# Patient Record
Sex: MALE | Race: WHITE
[De-identification: names, ages, dates, MRNs, and addresses within clinical notes are randomized per-mention and may not be internally consistent; named-entity substitution may affect disease eponyms.]

---

## 2018-03-20 ENCOUNTER — HOSPITAL ENCOUNTER (OUTPATIENT)
Dept: HOSPITAL 88 - CT | Age: 48
End: 2018-03-20
Attending: FAMILY MEDICINE
Payer: COMMERCIAL

## 2018-03-20 DIAGNOSIS — R10.84: Primary | ICD-10-CM

## 2018-03-20 LAB
BUN SERPL-MCNC: 22 MG/DL (ref 7–26)
BUN/CREAT SERPL: 23 (ref 6–25)
EGFRCR SERPLBLD CKD-EPI 2021: > 60 ML/MIN (ref 60–?)

## 2018-03-20 PROCEDURE — 36415 COLL VENOUS BLD VENIPUNCTURE: CPT

## 2018-03-20 PROCEDURE — 84520 ASSAY OF UREA NITROGEN: CPT

## 2018-03-20 PROCEDURE — 74177 CT ABD & PELVIS W/CONTRAST: CPT

## 2018-03-20 PROCEDURE — 82565 ASSAY OF CREATININE: CPT

## 2018-03-20 NOTE — DIAGNOSTIC IMAGING REPORT
PROCEDURE: CT ABDOMEN AND PELVIS WITH CONTRAST

 

TECHNIQUE: 

The abdomen and pelvis were scanned utilizing a multidetector helical 

scanner from the diaphragm to the lesser trochanter after the IV 

administration of 100 cc of Isovue 370 and the oral administration of 

water.  Coronal and sagittal multiplanar reformations were obtained.

 

COMPARISON: None.

 

INDICATIONS:   UPPER ABD PAIN

 

FINDINGS:

LOWER THORAX: Unremarkable.

 

HEPATOBILIARY: 1.1 x 1.1 cm low density simple cyst in hepatic segment 

VII at the dome (series 2 image 13). No other focal lesions. No biliary 

ductal dilation. Gallbladder is unremarkable.

SPLEEN: No splenomegaly.

PANCREAS: Normal pancreatic parenchymal enhancement. No peripancreatic 

inflammatory changes, free fluid or fluid collections. No focal lesions 

or ductal dilation.

 

ADRENALS: No adrenal nodules.

KIDNEYS/URETERS: No hydronephrosis, stones, or solid mass lesions.

PELVIC ORGANS/BLADDER: Bladder and prostate are unremarkable.

 

PERITONEUM / RETROPERITONEUM: Trace to small perihepatic and 

perisplenic ascites (series 2, image 19). Small amount of free fluid in 

the pelvis (for example series 2, image 87).

 

LYMPH NODES: No lymphadenopathy.

VESSELS: Unremarkable.

 

GI TRACT: Multiple loops of jejunum and proximal ileum show mild to 

moderate dilation, with maximal diameter of 4.4 cm (for example, 

coronal image 44 and series 2, image 41).

A transition point to decompressed bowel is noted in the mid ileum 

(coronal image 63 and sagittal image 84). No focal mass or wall 

thickening is noted At this location. The rest of the mid and distal 

small bowel as well the large bowel are decompressed. Questionable wall 

of the mid-distal ileum. Moderate associated mesenteric stranding and 

small amount of fluid in the mesenteric leaves. Moderate stranding with 

mild nodularity of the omentum (for example series 2, image 52). 

Stomach is unremarkable.

 

BONES AND SOFT TISSUES: No aggressive lytic lesion. Soft tissues are 

grossly unremarkable.

 

IMPRESSION:

 

1. Multiple loops of mild to moderately dilated jejunum and proximal 

ileum, with transition point in the mid ileum to normal-caliber bowel, 

with associated mesenteric stranding and small amount of mesenteric 

free fluid and ascites. Findings suggestive of enteritis, which may be 

infectious or inflammatory (including inflammatory bowel disease such 

as Crohn's). No prior surgical history to suggest adhesions. The large 

bowel is grossly unremarkable, without dilation, wall thickening or 

associated stranding.

2. Moderate stranding and mild nodularity of the omentum. This may be 

secondary to omental edema and inflammation from adjacent bowel loops. 

Neoplastic peritoneal disease may have similar appearance, and could be 

considered if there is prior history of primary neoplasm.

3. No CT evidence of pancreatitis.

4. Findings discussed with Dr. HUMBLE Ball March 20, 2018 at 1240 hrs.

 

 

Jasper Gonzalez M.D.  

Dictated by:  Jasper Gonzalez M.D. on 3/20/2018 at 13:07     

Electronically approved by:  Jasper Gonzalez M.D. on 3/20/2018 at 

13:07

## 2018-03-22 LAB
ALBUMIN SERPL-MCNC: 3.9 G/DL (ref 3.5–5)
ALBUMIN/GLOB SERPL: 1 {RATIO} (ref 0.8–2)
ALP SERPL-CCNC: 51 IU/L (ref 40–150)
ALT SERPL-CCNC: 33 IU/L (ref 0–55)
ANION GAP SERPL CALC-SCNC: 18.8 MMOL/L (ref 8–16)
BASOPHILS # BLD AUTO: 0 10*3/UL (ref 0–0.1)
BASOPHILS NFR BLD AUTO: 0.4 % (ref 0–1)
BUN SERPL-MCNC: 16 MG/DL (ref 7–26)
BUN/CREAT SERPL: 18 (ref 6–25)
CALCIUM SERPL-MCNC: 9.5 MG/DL (ref 8.4–10.2)
CHLORIDE SERPL-SCNC: 92 MMOL/L (ref 98–107)
CO2 SERPL-SCNC: 24 MMOL/L (ref 22–29)
DEPRECATED NEUTROPHILS # BLD AUTO: 7.4 10*3/UL (ref 2.1–6.9)
EGFRCR SERPLBLD CKD-EPI 2021: > 60 ML/MIN (ref 60–?)
EOSINOPHIL # BLD AUTO: 0.1 10*3/UL (ref 0–0.4)
EOSINOPHIL NFR BLD AUTO: 1 % (ref 0–6)
ERYTHROCYTE [DISTWIDTH] IN CORD BLOOD: 12.7 % (ref 11.7–14.4)
GLOBULIN PLAS-MCNC: 4.1 G/DL (ref 2.3–3.5)
GLUCOSE SERPLBLD-MCNC: 82 MG/DL (ref 74–118)
HCT VFR BLD AUTO: 43.8 % (ref 38.2–49.6)
HGB BLD-MCNC: 15 G/DL (ref 14–18)
LYMPHOCYTES # BLD: 1.5 10*3/UL (ref 1–3.2)
LYMPHOCYTES NFR BLD AUTO: 15.6 % (ref 18–39.1)
MCH RBC QN AUTO: 29.4 PG (ref 28–32)
MCHC RBC AUTO-ENTMCNC: 34.2 G/DL (ref 31–35)
MCV RBC AUTO: 85.7 FL (ref 81–99)
MONOCYTES # BLD AUTO: 0.8 10*3/UL (ref 0.2–0.8)
MONOCYTES NFR BLD AUTO: 7.7 % (ref 4.4–11.3)
NEUTS SEG NFR BLD AUTO: 75 % (ref 38.7–80)
PLATELET # BLD AUTO: 366 X10E3/UL (ref 140–360)
POTASSIUM SERPL-SCNC: 3.8 MMOL/L (ref 3.5–5.1)
RBC # BLD AUTO: 5.11 X10E6/UL (ref 4.3–5.7)
SODIUM SERPL-SCNC: 131 MMOL/L (ref 136–145)

## 2018-03-22 NOTE — DIAGNOSTIC IMAGING REPORT
PROCEDURE:

Frontal and lateral views of the chest.

 

COMPARISON: None.

 

INDICATIONS:   PRE OPERATIVE CHEST X-RAY FOR COLONOSCOPY, EGD

     

FINDINGS:

Lines/tubes:  None.

 

Lungs:  The lungs are well inflated and clear. There is no evidence of 

pneumonia or pulmonary edema.

 

Pleura:  There is no pleural effusion or pneumothorax.

 

Heart and mediastinum:  The heart and the mediastinum are normal.

 

Bones:  No acute bony abnormality.

 

IMPRESSION: 

 

1.  No acute cardiopulmonary abnormalities.

 

 

Jasper Gonzalez M.D.  

Dictated by:  Jasper Gonzalez M.D. on 3/22/2018 at 15:37     

Electronically approved by:  Jasper Gonzalez M.D. on 3/22/2018 at 

15:37

## 2018-03-23 ENCOUNTER — HOSPITAL ENCOUNTER (OUTPATIENT)
Dept: HOSPITAL 88 - OR | Age: 48
Discharge: HOME | End: 2018-03-23
Attending: SURGERY
Payer: COMMERCIAL

## 2018-03-23 DIAGNOSIS — K57.30: ICD-10-CM

## 2018-03-23 DIAGNOSIS — I10: ICD-10-CM

## 2018-03-23 DIAGNOSIS — Z01.812: ICD-10-CM

## 2018-03-23 DIAGNOSIS — Z01.810: ICD-10-CM

## 2018-03-23 DIAGNOSIS — K29.70: Primary | ICD-10-CM

## 2018-03-23 DIAGNOSIS — R19.09: ICD-10-CM

## 2018-03-23 DIAGNOSIS — E11.9: ICD-10-CM

## 2018-03-23 DIAGNOSIS — Z01.818: ICD-10-CM

## 2018-03-23 PROCEDURE — 88312 SPECIAL STAINS GROUP 1: CPT

## 2018-03-23 PROCEDURE — 80053 COMPREHEN METABOLIC PANEL: CPT

## 2018-03-23 PROCEDURE — 88305 TISSUE EXAM BY PATHOLOGIST: CPT

## 2018-03-23 PROCEDURE — 86301 IMMUNOASSAY TUMOR CA 19-9: CPT

## 2018-03-23 PROCEDURE — 36415 COLL VENOUS BLD VENIPUNCTURE: CPT

## 2018-03-23 PROCEDURE — 71046 X-RAY EXAM CHEST 2 VIEWS: CPT

## 2018-03-23 PROCEDURE — 88342 IMHCHEM/IMCYTCHM 1ST ANTB: CPT

## 2018-03-23 PROCEDURE — 82948 REAGENT STRIP/BLOOD GLUCOSE: CPT

## 2018-03-23 PROCEDURE — 43239 EGD BIOPSY SINGLE/MULTIPLE: CPT

## 2018-03-23 PROCEDURE — 85025 COMPLETE CBC W/AUTO DIFF WBC: CPT

## 2018-03-23 PROCEDURE — 82378 CARCINOEMBRYONIC ANTIGEN: CPT

## 2018-03-23 PROCEDURE — 93005 ELECTROCARDIOGRAM TRACING: CPT

## 2018-03-23 PROCEDURE — 45380 COLONOSCOPY AND BIOPSY: CPT

## 2018-03-23 NOTE — XMS REPORT
Patient Summary Document

 Created on: 2018



TOMMY ROMANO

External Reference #: [id number]

: 1970

Sex: Male



Demographics







 Address  [address] [place] [address]  [address]

 

 Home Phone  [phone]

 

 Preferred Language  Unknown

 

 Marital Status  Unknown

 

 Anabaptist Affiliation  Unknown

 

 Race  Unknown

 

 Additional Race(s)  

 

 Ethnic Group  Unknown





Author







 Author  [facility]

 

 Address  Unknown

 

 Phone  Unavailable







Care Team Providers







 Care Team Member Name  Role  Phone

 

 SB WRIGHT  Unavailable  Unavailable

 

 TOMMY BALL  Unavailable  Unavailable







Problems

This patient has no known problems.



Allergies, Adverse Reactions, Alerts

This patient has no known allergies or adverse reactions.



Medications

This patient has no known medications.



Results







 Test Description  Test Time  Test Comments  Text Results  Atomic Results  
Result Comments









 CHEST 2 VIEWS            [place] [address]      Patient Name: TOMMY ROMANO   MR #: [id number]    : 1970 Age/Sex: 47/M  Acct #: [id number] Req 
#: [id number]  [facility] Physician:     Ordered by: SB WRIGHT MD  Report #: 
[id number]   Location: OR  Room/Bed:     ________________________________________
___________________________________________________________    Procedure: [id number]
[id number] DX/CHEST 2 VIEWS  Exam Date: 18                            Exam Time
: 1500       REPORT STATUS: Signed    PROCEDURE:   Frontal and lateral views of 
the chest.       COMPARISON: None.       INDICATIONS:   PRE OPERATIVE CHEST X-
RAY FOR COLONOSCOPY, EGD           FINDINGS:   Lines/tubes:  None.       Lungs:
  The lungs are well inflated and clear. There is no evidence of    pneumonia 
or pulmonary edema.       Pleura:  There is no pleural effusion or 
pneumothorax.       Heart and mediastinum:  The heart and the mediastinum are 
normal.       Bones:  No acute bony abnormality.       IMPRESSION:        1.  
No acute cardiopulmonary abnormalities.           Dominguez Gonzalez M.D.     
Dictated by:  Dominguez Gonzalez M.D. on 3/22/2018 at 15:37        
Electronically approved by:  Dominguez Gonzalez M.D. on 3/22/2018 at    15:37  
              Dictated By: DOMINGUEZ GONZALEZ MD  Electronically Signed By: DOMINGUEZ GONZALEZ MD on 18  Transcribed By: ISABEL on 18       COPY 
TO:   SB WRIGHT MD           

 

 CT ABDOMEN/PELVIS W            [place] [address]      Patient Name: TOMMY ROMANO   MR #: [id number]    : 1970 Age/Sex: 47/M  Acct #: 
[id number] Req #: [id number]  Adm Physician:     Ordered by: BALL ANDREW DO
  Report #: [id number]   Location: CT  Room/Bed:     ____________________________
_______________________________________________________________________    
Procedure: [id number] CT/CT ABDOMEN/PELVIS W  Exam Date: 18               
             Exam Time: 1200       REPORT STATUS: Signed    PROCEDURE: CT 
ABDOMEN AND PELVIS WITH CONTRAST       TECHNIQUE:    The abdomen and pelvis 
were scanned utilizing a multidetector helical    scanner from the diaphragm to 
the lesser trochanter after the IV    administration of 100 cc of Isovue 370 
and the oral administration of    water.  Coronal and sagittal multiplanar 
reformations were obtained.       COMPARISON: None.       INDICATIONS:   UPPER 
ABD PAIN       FINDINGS:   LOWER THORAX: Unremarkable.       HEPATOBILIARY: 1.1 
x 1.1 cm low density simple cyst in hepatic segment    VII at the dome (series 
2 image 13). No other focal lesions. No biliary    ductal dilation. Gallbladder 
is unremarkable.   SPLEEN: No splenomegaly.   PANCREAS: Normal pancreatic 
parenchymal enhancement. No peripancreatic    inflammatory changes, free fluid 
or fluid collections. No focal lesions    or ductal dilation.       ADRENALS: 
No adrenal nodules.   KIDNEYS/URETERS: No hydronephrosis, stones, or solid mass 
lesions.   PELVIC ORGANS/BLADDER: Bladder and prostate are unremarkable.       
PERITONEUM / RETROPERITONEUM: Trace to small perihepatic and    perisplenic 
ascites (series 2, image 19). Small amount of free fluid in    the pelvis (for 
example series 2, image 87).       LYMPH NODES: No lymphadenopathy.   VESSELS: 
Unremarkable.       GI TRACT: Multiple loops of jejunum and proximal ileum show 
mild to    moderate dilation, with maximal diameter of 4.4 cm (for example,    
coronal image 44 and series 2, image 41).   A transition point to decompressed 
bowel is noted in the mid ileum    (coronal image 63 and sagittal image 84). No 
focal mass or wall    thickening is noted At this location. The rest of the mid 
and distal    small bowel as well the large bowel are decompressed. 
Questionable wall    of the mid-distal ileum. Moderate associated mesenteric 
stranding and    small amount of fluid in the mesenteric leaves. Moderate 
stranding with    mild nodularity of the omentum (for example series 2, image 52
).    Stomach is unremarkable.       BONES AND SOFT TISSUES: No aggressive 
lytic lesion. Soft tissues are    grossly unremarkable.       IMPRESSION:       
1. Multiple loops of mild to moderately dilated jejunum and proximal    ileum, 
with transition point in the mid ileum to normal-caliber bowel,    with 
associated mesenteric stranding and small amount of mesenteric    free fluid 
and ascites. Findings suggestive of enteritis, which may be    infectious or 
inflammatory (including inflammatory bowel disease such    as Crohn's). No 
prior surgical history to suggest adhesions. The large    bowel is grossly 
unremarkable, without dilation, wall thickening or    associated stranding.   
2. Moderate stranding and mild nodularity of the omentum. This may be    
secondary to omental edema and inflammation from adjacent bowel loops.    
Neoplastic peritoneal disease may have similar appearance, and could be    
considered if there is prior history of primary neoplasm.   3. No CT evidence 
of pancreatitis.   4. Findings discussed with Dr. HUMBLE Blal 2018 at 
1240 hrs.           Dominguez Gonzalez M.D.     Dictated by:  Dominguez Gonzalez M.D. on 3/20/2018 at 13:07        Electronically approved by:  Dominguez Gonzalez M.D. on 3/20/2018 at    13:07                Dictated By: DOMINGUEZ GONZALEZ MD  Electronically Signed By: DOMINGUEZ GONZALEZ MD on 18 1301  
Transcribed By: ISABEL on 18 1302       COPY TO:   TOMMY BALL DO